# Patient Record
(demographics unavailable — no encounter records)

---

## 2024-11-07 NOTE — PHYSICAL EXAM
[Alert] : alert [Well Nourished] : well nourished [Healthy Appearance] : healthy appearance [No Acute Distress] : no acute distress [Well Developed] : well developed [Sclera Not Icteric] : sclera not icteric [Normal TMs] : both tympanic membranes were normal [Normal Nasal Mucosa] : the nasal mucosa was normal [Normal Lips/Tongue] : the lips and tongue were normal [Normal Outer Ear/Nose] : the ears and nose were normal in appearance [No Thrush] : no thrush [Pale mucosa] : pale mucosa [Supple] : the neck was supple [Normal Rate and Effort] : normal respiratory rhythm and effort [No Crackles] : no crackles [Bilateral Audible Breath Sounds] : bilateral audible breath sounds [Normal Rate] : heart rate was normal  [Regular Rhythm] : with a regular rhythm [Soft] : abdomen soft [Not Tender] : non-tender [No HSM] : no hepato-splenomegaly [No Rash] : no rash [No clubbing] : no clubbing [No Cyanosis] : no cyanosis [Normal Mood] : mood was normal [Normal Affect] : affect was normal [Alert, Awake, Oriented as Age-Appropriate] : alert, awake, oriented as age appropriate [Conjunctival Erythema] : no conjunctival erythema [Wheezing] : no wheezing was heard [Dermatographism] : no dermatographism [de-identified] : BMI-37.5

## 2024-11-07 NOTE — HISTORY OF PRESENT ILLNESS
[de-identified] : 50 year old male with psoriatic arthritis, OA, arrythmias (has implantable loop recorder), GERD, polyethylene glycol allergy (history of anaphylaxis to Miralax;), mildly elevated  tryptase level, multiple environmental allergies and food (shrimp- developed in his 30s) = polyethylene glycol allergy (history of anaphylaxis to Miralax, large wheal to PEG prick skin testing by Dr Zuleta). Returns today for flu shot administration and HAT genetic testing.   11/6/24: - avoids shellfish --- shrimp- swollen tongue in adulthood after tolerating it for decades; swollen eye after touching his eye after touching shrimp - gets itchy mouth with apple, tolerates heated/microwaved - Has AuviQ - has seasonal allergies - years ago had a slight wheeze and was prescribed Xopenex. Gets it with URIs - 2/6/2024- Fraction of exhaled nitric oxide (FEno) - 13;  - 11/29/2- received and tolerated fluzone - patient has questions about this year's flu and COVID-19 vaccines and about other medications - normal 24 hour urine for N-methylhistamine, Leukotriene E4 and  prostaglandin F2 - no history of recurrent urticaria, no drug reactions aside of anaphylaxis to polyethylene glycol and positive skin testing to Polysorbate 80     11/29/23: He previously tolerated multiple oral medications that contain polyethylene glycol and Polysorbate 80 ( Acetaminophen (Tylenol), [facility] ASA, Diphenhydramine/Benadryl and Ibuprofen) but he has not taken any of these things  since his last visit due to concern for PEG reaction  Additional cardiology Hx: -Had short run VT after last COVID shot -D/W cardiology- cardiology said to hold off, PCP said to get it  -Is wondering if Paxlovid can be compiunded w/o PEG

## 2024-11-07 NOTE — PHYSICAL EXAM
[Alert] : alert [Well Nourished] : well nourished [Healthy Appearance] : healthy appearance [No Acute Distress] : no acute distress [Well Developed] : well developed [Sclera Not Icteric] : sclera not icteric [Normal TMs] : both tympanic membranes were normal [Normal Nasal Mucosa] : the nasal mucosa was normal [Normal Lips/Tongue] : the lips and tongue were normal [Normal Outer Ear/Nose] : the ears and nose were normal in appearance [No Thrush] : no thrush [Pale mucosa] : pale mucosa [Supple] : the neck was supple [Normal Rate and Effort] : normal respiratory rhythm and effort [No Crackles] : no crackles [Bilateral Audible Breath Sounds] : bilateral audible breath sounds [Normal Rate] : heart rate was normal  [Regular Rhythm] : with a regular rhythm [Soft] : abdomen soft [Not Tender] : non-tender [No HSM] : no hepato-splenomegaly [No Rash] : no rash [No clubbing] : no clubbing [No Cyanosis] : no cyanosis [Normal Mood] : mood was normal [Normal Affect] : affect was normal [Alert, Awake, Oriented as Age-Appropriate] : alert, awake, oriented as age appropriate [Conjunctival Erythema] : no conjunctival erythema [Wheezing] : no wheezing was heard [Dermatographism] : no dermatographism [de-identified] : BMI-37.5

## 2024-11-07 NOTE — DATA REVIEWED
[FreeTextEntry1] : 2/6/24: - spirometry- mild obstruction, normal DLCO - Fraction of exhaled nitric oxide (FEno) - 13

## 2024-11-07 NOTE — HISTORY OF PRESENT ILLNESS
[de-identified] : 50 year old male with psoriatic arthritis, OA, arrythmias (has implantable loop recorder), GERD, polyethylene glycol allergy (history of anaphylaxis to Miralax;), mildly elevated  tryptase level, multiple environmental allergies and food (shrimp- developed in his 30s) = polyethylene glycol allergy (history of anaphylaxis to Miralax, large wheal to PEG prick skin testing by Dr Zuleta). Returns today for flu shot administration and HAT genetic testing.   11/6/24: - avoids shellfish --- shrimp- swollen tongue in adulthood after tolerating it for decades; swollen eye after touching his eye after touching shrimp - gets itchy mouth with apple, tolerates heated/microwaved - Has AuviQ - has seasonal allergies - years ago had a slight wheeze and was prescribed Xopenex. Gets it with URIs - 2/6/2024- Fraction of exhaled nitric oxide (FEno) - 13;  - 11/29/2- received and tolerated fluzone - patient has questions about this year's flu and COVID-19 vaccines and about other medications - normal 24 hour urine for N-methylhistamine, Leukotriene E4 and  prostaglandin F2 - no history of recurrent urticaria, no drug reactions aside of anaphylaxis to polyethylene glycol and positive skin testing to Polysorbate 80     11/29/23: He previously tolerated multiple oral medications that contain polyethylene glycol and Polysorbate 80 ( Acetaminophen (Tylenol), [facility] ASA, Diphenhydramine/Benadryl and Ibuprofen) but he has not taken any of these things  since his last visit due to concern for PEG reaction  Additional cardiology Hx: -Had short run VT after last COVID shot -D/W cardiology- cardiology said to hold off, PCP said to get it  -Is wondering if Paxlovid can be compiunded w/o PEG

## 2024-11-08 NOTE — PROCEDURE
[FreeTextEntry1] : 11/08/2024 Pulmonary function testing There is a mild ventilatory impairment in a restrictive pattern. There is elevation in the RV/TLC ratio indicative of possible air trapping. Diffusion capacity is normal.  Very mild decrease in function compared to February 2024.

## 2024-11-08 NOTE — ASSESSMENT
[FreeTextEntry1] : Patient compliant to CPAP therapy and having positive clinical response to treatment. increased IPAP 10 EPAP 6 PRN Xopenex Follow-up in 1 year or sooner on a as needed basis. Follow-up primary care. Would benefit from program of weight loss

## 2024-11-08 NOTE — HISTORY OF PRESENT ILLNESS
[Former] : former [TextBox_4] : Here because for the past few months has had increase fatigue, wakes up tired having headaches about 2 times a week behind eyes  has had recent labs done also checked o2 lying down and noticed it can decrease.  Lowest he has seen is 90%.  Generally approximately 95. Has ELIZABETH on Bipap, for years we do not have original sleep study, was told moderate to severe Previously seeing Dr Melvin not taking Xopenex  sob with 3 flights of stairs needs to lose weight    [TextBox_13] : college

## 2024-11-08 NOTE — DISCUSSION/SUMMARY
[FreeTextEntry1] : ELIZABETH on Bipap. Doing well.  Equipment few years old. Probable mild airways disease.  History of atrial arrhythmias. Allergic to polyethylene glycol Mild restrictive disease likely related to body habitus and weight gain.

## 2025-03-03 NOTE — DISCUSSION/SUMMARY
[FreeTextEntry1] : Chest discomfort.  Etiology unclear.  Likely not related to embolic disease is not pleuritic in nature but should exclude will obtain D-dimer.  Could be GI in origin.  Has seen cardiology. Shortness of breath. ELIZABETH on Bipap.  Previously doing well.  Equipment few years old. Probable mild airways disease.  History of atrial arrhythmias. Allergic to polyethylene glycol Mild restrictive disease likely related to body habitus and weight gain.

## 2025-03-03 NOTE — ASSESSMENT
[FreeTextEntry1] : Trial PPI prior to dinner.  Continue Pulmicort for now Xopenox PRN D Dimer Follow-up in few weeks if not clinically improved.  Sooner on a as needed basis. Restart CPAP. Would benefit from program of weight loss

## 2025-03-03 NOTE — HISTORY OF PRESENT ILLNESS
[Former] : former [TextBox_4] : Had inflenza 2 weeks ago  no treatment given  went to [facility] 1 week ago for chest tightness and sometimes a burning across back and was given Pulmicort. Positive cough.  Flavia has been noticing intermittent increase episodes in Heart rate having episodes up to 120 with activity like a short walk and come down quickly has loop recorder did stop cpap when had respiratory s/s asking for pressure on cpap to decrease slightly also, this am started with an ache on left side of chest toward pain, dull, has gotten better as day went on today pt not sure if this is due to going back on cpap pt was using pulse o2 and with walking no desaturation, just elevated HR.  No definitive GERD.  Not pleuritic   did see cardio this week, was not concerned, pt feels after seeing hm the heart rate variability has been worse ordered a cardiac MRI  Has ELIZABETH on Bipap, for years we do not have original sleep study, was told moderate to severe Previously seeing Dr Melvin not taking Xopenex  needs to lose weight  Only recent travel drove to [place] by car 2-3 hour.     [TextBox_13] : college

## 2025-03-03 NOTE — HISTORY OF PRESENT ILLNESS
[Former] : former [TextBox_4] : Had inflenza 2 weeks ago  no treatment given  went to [facility] 1 week ago for chest tightness and sometimes a burning across back and was given Pulmicort. Positive cough.  Flavia has been noticing intermittent increase episodes in Heart rate having episodes up to 120 with activity like a short walk and come down quickly has loop recorder did stop cpap when had respiratory s/s asking for pressure on cpap to decrease slightly also, this am started with an ache on left side of chest toward pain, dull, has gotten better as day went on today pt not sure if this is due to going back on cpap pt was using pulse o2 and with walking no desaturation, just elevated HR.  No definitive GERD.  Not pleuritic   did see cardio this week, was not concerned, pt feels after seeing hm the heart rate variability has been worse ordered a cardiac MRI  Has ELIZABETH on Bipap, for years we do not have original sleep study, was told moderate to severe Previously seeing Dr Melvin not taking Xopenex  needs to lose weight  Only recent travel drove to [place] by car 2-3 hour.     [TextBox_13] : college

## 2025-04-04 NOTE — DISCUSSION/SUMMARY
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  His current symptoms correlate with sinus tachycardia. We recommended that he seek consultation with a dysautonomia expert at [facility] (Dr. Rex Fonseca). We will be able to continue to monitor his symptoms using remote monitoring. I recommended that he continue to follow with Dr. Estrada. He will see me again as needed.  [EKG obtained to assist in diagnosis and management of assessed problem(s)] : EKG obtained to assist in diagnosis and management of assessed problem(s)

## 2025-04-04 NOTE — HISTORY OF PRESENT ILLNESS
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 He has previously been evaluated by Dr. Curtis Parrish at [place], Dr. Luis Manuel Owusu at [facility] and Dr. Jimmy Carlisle at [facility].   To briefly summarize his history, he was treated with a B-blocker in 2019 after being found to have non-sustained ventricular tachycardia. His prior workup has included a nuclear stress test, a cardiac MRI and a cardiac CT. He has also previously undergone a tilt table test in February of 2018 that was positive for a "vasovagal response." He was treated previously with fludrocortisone. This medication did not alleviate his symptoms. Salt tablets and compression stockings also have not improved his symptoms. 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 He also has three VUS - COLQ gene, SYNE2 gene and SCN4A gene.   Since his initial visit with me in March of 2023 he has had good and bad days in regards to his tachycardia. The episodes are triggered by heat, stress and exercise. He had a respiratory infection about two months ago. For 2-3 weeks following the infection he noted an abrupt increase in his heart rate from the 60-80's bpm to the 130's. He monitors his heart rate using his Apple watch. 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 He needed to lay down before his heart rate decreased. Hus impression is that he has dysautonomia. He is interested in seeing a dysautonomia specialist.   Otherwise, he denies any pre-syncope or syncope. He drinks at least 8-10 glasses of water each day. He exercises by walking on the treadmill for 30 minutes when he is able. Any higher level of activity results in him having symptoms for days. He feels a "brain fog" after exercising and has a difficult time working..  He has a Medtronic loop recorder that was implanted by Dr. Melton on 9/24/2021.  His ILR interrogation from this visit showed that during his most recent symptom activation on 3/3/2025, the rhythm was sinus tachycardia ~140-150's bpm. There were no detections of  AT/AF.   CHADS2-VASC: 0

## 2025-04-04 NOTE — CARDIOLOGY SUMMARY
[de-identified] : ECG from 4/4/2025: Sinus rhythm at 74bpm ECG from 3/24/2023: Sinus rhythm at 71bpm, negative precordial T-waves [de-identified] : Nuclear Stress Test from 2/22/2018: Normal exercise ECG, no aginal symptoms at maximal exertion, the patient reported generalized fatigue, normal RV function, normal LVSF - EF: 52%, normal regional wall motion and thickening, SPECT imaging demonstrates normal perfusion [de-identified] : TTE from 2/15/2018: EF: 65%, trace MR\par  \par  TTE from 8/15/2017: EF: 55-60%, min MR, LA, RA and RV not well visualized, min TR, min PI, no pericardial effusion\par  \par  TTE from 1/24/2017: EF: 65% [de-identified] : Cardiac MRI from 5/14/2020) - performed at [facility]: normal biventricular function, no evidence of arrhythmogenic cardiomyopathy, no evidence of sarcoidosis, LVEF: 57%, mild TR, trace MR, ILR present, right renal cyst

## 2025-04-04 NOTE — CARDIOLOGY SUMMARY
[de-identified] : ECG from 4/4/2025: Sinus rhythm at 74bpm ECG from 3/24/2023: Sinus rhythm at 71bpm, negative precordial T-waves [de-identified] : Nuclear Stress Test from 2/22/2018: Normal exercise ECG, no aginal symptoms at maximal exertion, the patient reported generalized fatigue, normal RV function, normal LVSF - EF: 52%, normal regional wall motion and thickening, SPECT imaging demonstrates normal perfusion [de-identified] : TTE from 2/15/2018: EF: 65%, trace MR\par  \par  TTE from 8/15/2017: EF: 55-60%, min MR, LA, RA and RV not well visualized, min TR, min PI, no pericardial effusion\par  \par  TTE from 1/24/2017: EF: 65% [de-identified] : Cardiac MRI from 5/14/2020) - performed at [place]: normal biventricular function, no evidence of arrhythmogenic cardiomyopathy, no evidence of sarcoidosis, LVEF: 57%, mild TR, trace MR, ILR present, right renal cyst

## 2025-04-04 NOTE — PHYSICAL EXAM
[No Acute Distress] : no acute distress [Normal Conjunctiva] : normal conjunctiva [No Rub] : no rub [No Gallop] : no gallop [de-identified] : ILR palpable

## 2025-04-04 NOTE — END OF VISIT
[FreeTextEntry3] : Patient seen with JOE Saavedra. Symptoms due to sinus tachycardia. Recommended consultation with dysautonomia specialist.  [Time Spent: ___ minutes] : I have spent [unfilled] minutes of time on the encounter which excludes teaching and separately reported services.

## 2025-04-04 NOTE — PHYSICAL EXAM
[No Acute Distress] : no acute distress [Normal Conjunctiva] : normal conjunctiva [No Rub] : no rub [No Gallop] : no gallop [de-identified] : ILR palpable

## 2025-04-04 NOTE — HISTORY OF PRESENT ILLNESS
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 He has previously been evaluated by Dr. Curtis Parrish at [place], Dr. Luis Manuel Owusu at [facility] and Dr. Jimmy Carlisle at [facility].   To briefly summarize his history, he was treated with a B-blocker in 2019 after being found to have non-sustained ventricular tachycardia. His prior workup has included a nuclear stress test, a cardiac MRI and a cardiac CT. He has also previously undergone a tilt table test in February of 2018 that was positive for a "vasovagal response." He was treated previously with fludrocortisone. This medication did not alleviate his symptoms. Salt tablets and compression stockings also have not improved his symptoms. 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 He also has three VUS - COLQ gene, SYNE2 gene and SCN4A gene.   Since his initial visit with me in March of 2023 he has had good and bad days in regards to his tachycardia. The episodes are triggered by heat, stress and exercise. He had a respiratory infection about two months ago. For 2-3 weeks following the infection he noted an abrupt increase in his heart rate from the 60-80's bpm to the 130's. He monitors his heart rate using his Apple watch. 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 He needed to lay down before his heart rate decreased. Hus impression is that he has dysautonomia. He is interested in seeing a dysautonomia specialist.   Otherwise, he denies any pre-syncope or syncope. He drinks at least 8-10 glasses of water each day. He exercises by walking on the treadmill for 30 minutes when he is able. Any higher level of activity results in him having symptoms for days. He feels a "brain fog" after exercising and has a difficult time working..  He has a Medtronic loop recorder that was implanted by Dr. Melton on 9/24/2021.  His ILR interrogation from this visit showed that during his most recent symptom activation on 3/3/2025, the rhythm was sinus tachycardia ~140-150's bpm. There were no detections of  AT/AF.   CHADS2-VASC: 0

## 2025-05-26 NOTE — ASSESSMENT
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 Ongoing cardiac work up and treatment for electric instability, advised to see dysautonomia expert at [place] Also followed by Allergy/immunology and have previously d/w him the abnl labs in his record more suggested immune/allergic as opposed to auto-immune psoriasis is minimal now  => hx of ps, OA, d/w him I do not believe his sx are psoriatic arthritis nor is it clear they are rheumatic d/o >> agree with need to assess for autonomic dysfunction and also requested the bx he had that suggested this vs sm fiver n. >> would not initiate Methotrexate at this time given the above questions are still being assessed. labs ordered to monitor disease activity and for medication side effects.  Will notify of results patient is aware of and in agreement with assessment and plans  fuv 3 months

## 2025-05-26 NOTE — HISTORY OF PRESENT ILLNESS
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  denies joint swelling  feels overall sense of inflammation  Went to the ED for a recent event  Ongoing cardiac work up and treatment for electric instability, advised to see dysautonomia expert at [place] Also followed by Allergy/immunology and have previously d/w him the abnl labs in his record more suggested immune/allergic as opposed to auto-immune  psoriasis is minimal now  not in a flare today Pt says "someone" suggested methotrexate for the ongoing issues      from initial visit:  49 yom with ongoing cardiac issues, referred for ?PsA, transferring all care to [facility]. His main concern is "inflammation" and if he has an underlying d/o that is contributing to cardiac issues. Has multiple evaluations for neuromusc and genetic d/o, inconclusive.  HPI begins with rash, bx strongly suggestive of psoriasis > initially inverse, base of scalp. Reports the following joint pain, specifically "worse with actvity": R knee and R shoulder > denies hand/wrist/elbow pain, no swelling no dactylitis or enthesitis +plantar fasciitis bx 2019 psoriasis  was treated with Humira by Dr. Amanda Webber > thought pt had nail pitting developed palpitations then also saw Dr. Haney in the past has issues w arrythmia > developed VT and this stopped when he came off Humira Dr. Haney used Tremfya which lead to myalgia has joint pain when he lies on R side of shoulder otherwise has pain only when he uses joints

## 2025-05-26 NOTE — PHYSICAL EXAM
[General Appearance - Alert] : alert [General Appearance - In No Acute Distress] : in no acute distress [Oropharynx] : the oropharynx was normal [] : the neck was supple [Auscultation Breath Sounds / Voice Sounds] : lungs were clear to auscultation bilaterally [Heart Sounds] : normal S1 and S2 [No Spinal Tenderness] : no spinal tenderness [No Focal Deficits] : no focal deficits [Affect] : the affect was normal [Abnormal Walk] : normal gait [Musculoskeletal - Swelling] : no joint swelling seen [FreeTextEntry1] : all joints full rom   R shoulder mild TTP lateral bilat knee valgus, crepitis mild

## 2025-05-26 NOTE — ASSESSMENT
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 Ongoing cardiac work up and treatment for electric instability, advised to see dysautonomia expert at [place] Also followed by Allergy/immunology and have previously d/w him the abnl labs in his record more suggested immune/allergic as opposed to auto-immune psoriasis is minimal now  => hx of ps, OA, d/w him I do not believe his sx are psoriatic arthritis nor is it clear they are rheumatic d/o >> agree with need to assess for autonomic dysfunction and also requested the bx he had that suggested this vs sm fiver n. >> would not initiate Methotrexate at this time given the above questions are still being assessed. labs ordered to monitor disease activity and for medication side effects.  Will notify of results patient is aware of and in agreement with assessment and plans  fuv 3 months

## 2025-05-26 NOTE — HISTORY OF PRESENT ILLNESS
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  denies joint swelling  feels overall sense of inflammation  Went to the ED for a recent event  Ongoing cardiac work up and treatment for electric instability, advised to see dysautonomia expert at [place] Also followed by Allergy/immunology and have previously d/w him the abnl labs in his record more suggested immune/allergic as opposed to auto-immune  psoriasis is minimal now  not in a flare today Pt says "someone" suggested methotrexate for the ongoing issues      from initial visit:  49 yom with ongoing cardiac issues, referred for ?PsA, transferring all care to [facility]. His main concern is "inflammation" and if he has an underlying d/o that is contributing to cardiac issues. Has multiple evaluations for neuromusc and genetic d/o, inconclusive.  HPI begins with rash, bx strongly suggestive of psoriasis > initially inverse, base of scalp. Reports the following joint pain, specifically "worse with actvity": R knee and R shoulder > denies hand/wrist/elbow pain, no swelling no dactylitis or enthesitis +plantar fasciitis bx 2019 psoriasis  was treated with Humira by Dr. Amanda Webber > thought pt had nail pitting developed palpitations then also saw Dr. Haney in the past has issues w arrythmia > developed VT and this stopped when he came off Humira Dr. Haney used Tremfya which lead to myalgia has joint pain when he lies on R side of shoulder otherwise has pain only when he uses joints